# Patient Record
Sex: FEMALE | Race: WHITE | NOT HISPANIC OR LATINO | URBAN - METROPOLITAN AREA
[De-identification: names, ages, dates, MRNs, and addresses within clinical notes are randomized per-mention and may not be internally consistent; named-entity substitution may affect disease eponyms.]

---

## 2018-02-05 ENCOUNTER — EMERGENCY (EMERGENCY)
Facility: HOSPITAL | Age: 58
LOS: 1 days | End: 2018-02-05
Payer: MEDICAID

## 2018-02-05 PROCEDURE — 76856 US EXAM PELVIC COMPLETE: CPT | Mod: 26

## 2018-02-05 PROCEDURE — 99284 EMERGENCY DEPT VISIT MOD MDM: CPT

## 2018-02-05 PROCEDURE — 74177 CT ABD & PELVIS W/CONTRAST: CPT | Mod: 26

## 2018-10-01 ENCOUNTER — EMERGENCY (EMERGENCY)
Facility: HOSPITAL | Age: 58
LOS: 1 days | End: 2018-10-01
Payer: MEDICAID

## 2018-10-01 PROCEDURE — 73660 X-RAY EXAM OF TOE(S): CPT | Mod: 26,LT

## 2018-10-01 PROCEDURE — 99283 EMERGENCY DEPT VISIT LOW MDM: CPT

## 2019-04-17 ENCOUNTER — INBOUND DOCUMENT (OUTPATIENT)
Age: 59
End: 2019-04-17

## 2019-04-17 PROBLEM — Z00.00 ENCOUNTER FOR PREVENTIVE HEALTH EXAMINATION: Status: ACTIVE | Noted: 2019-04-17

## 2019-10-24 ENCOUNTER — APPOINTMENT (OUTPATIENT)
Dept: CARDIOLOGY | Facility: CLINIC | Age: 59
End: 2019-10-24
Payer: MEDICAID

## 2019-10-24 ENCOUNTER — NON-APPOINTMENT (OUTPATIENT)
Age: 59
End: 2019-10-24

## 2019-10-24 VITALS
HEART RATE: 80 BPM | HEIGHT: 61 IN | OXYGEN SATURATION: 98 % | SYSTOLIC BLOOD PRESSURE: 112 MMHG | DIASTOLIC BLOOD PRESSURE: 68 MMHG | BODY MASS INDEX: 23.6 KG/M2 | WEIGHT: 125 LBS

## 2019-10-24 DIAGNOSIS — Z78.9 OTHER SPECIFIED HEALTH STATUS: ICD-10-CM

## 2019-10-24 DIAGNOSIS — F17.200 NICOTINE DEPENDENCE, UNSPECIFIED, UNCOMPLICATED: ICD-10-CM

## 2019-10-24 DIAGNOSIS — Z86.59 PERSONAL HISTORY OF OTHER MENTAL AND BEHAVIORAL DISORDERS: ICD-10-CM

## 2019-10-24 DIAGNOSIS — Z82.5 FAMILY HISTORY OF ASTHMA AND OTHER CHRONIC LOWER RESPIRATORY DISEASES: ICD-10-CM

## 2019-10-24 DIAGNOSIS — Z82.49 FAMILY HISTORY OF ISCHEMIC HEART DISEASE AND OTHER DISEASES OF THE CIRCULATORY SYSTEM: ICD-10-CM

## 2019-10-24 DIAGNOSIS — Z72.89 OTHER PROBLEMS RELATED TO LIFESTYLE: ICD-10-CM

## 2019-10-24 DIAGNOSIS — Z80.0 FAMILY HISTORY OF MALIGNANT NEOPLASM OF DIGESTIVE ORGANS: ICD-10-CM

## 2019-10-24 PROCEDURE — 99204 OFFICE O/P NEW MOD 45 MIN: CPT | Mod: 25

## 2019-10-24 PROCEDURE — 93000 ELECTROCARDIOGRAM COMPLETE: CPT

## 2019-10-24 NOTE — PHYSICAL EXAM
[Normal Appearance] : normal appearance [General Appearance - Well Developed] : well developed [General Appearance - In No Acute Distress] : no acute distress [General Appearance - Well Nourished] : well nourished [Well Groomed] : well groomed [Normal Conjunctiva] : the conjunctiva exhibited no abnormalities [Normal Oral Mucosa] : normal oral mucosa [Eyelids - No Xanthelasma] : the eyelids demonstrated no xanthelasmas [No Oral Pallor] : no oral pallor [No Oral Cyanosis] : no oral cyanosis [FreeTextEntry1] : No JVD, no carotid artery bruits auscultated bilaterally [Heart Rate And Rhythm] : heart rate and rhythm were normal [Edema] : no peripheral edema present [Murmurs] : no murmurs present [Heart Sounds] : normal S1 and S2 [Respiration, Rhythm And Depth] : normal respiratory rhythm and effort [Exaggerated Use Of Accessory Muscles For Inspiration] : no accessory muscle use [Abnormal Walk] : normal gait [Auscultation Breath Sounds / Voice Sounds] : lungs were clear to auscultation bilaterally [Nail Clubbing] : no clubbing of the fingernails [Gait - Sufficient For Exercise Testing] : the gait was sufficient for exercise testing [Cyanosis, Localized] : no localized cyanosis [Skin Color & Pigmentation] : normal skin color and pigmentation [] : no rash [Impaired Insight] : insight and judgment were intact [Skin Turgor] : normal skin turgor [Affect] : the affect was normal [Memory Recent] : recent memory was not impaired

## 2019-10-24 NOTE — DISCUSSION/SUMMARY
[FreeTextEntry1] : 1. Chest Pain/Dyspnea: I am recommending a plain treadmill stress test and echocardiogram. I educated the patient on the importance of smoking cessation going forward no matter what our testing reveals. I educated her on the dangers of continued smoking. \par \par I will call patient with results.

## 2019-10-24 NOTE — REVIEW OF SYSTEMS
[Shortness Of Breath] : shortness of breath [Dyspnea on exertion] : dyspnea during exertion [Chest Pain] : chest pain [Lower Ext Edema] : no extremity edema [Palpitations] : no palpitations [Cough] : no cough [Wheezing] : no wheezing [Joint Pain] : joint pain [Joint Stiffness] : joint stiffness [Depression] : depression [Anxiety] : anxiety [Negative] : Heme/Lymph

## 2019-11-01 ENCOUNTER — APPOINTMENT (OUTPATIENT)
Dept: CARDIOLOGY | Facility: CLINIC | Age: 59
End: 2019-11-01
Payer: MEDICAID

## 2019-11-01 PROCEDURE — 93306 TTE W/DOPPLER COMPLETE: CPT

## 2019-11-07 ENCOUNTER — APPOINTMENT (OUTPATIENT)
Dept: MRI IMAGING | Facility: CLINIC | Age: 59
End: 2019-11-07
Payer: MEDICAID

## 2019-11-07 PROCEDURE — 72148 MRI LUMBAR SPINE W/O DYE: CPT

## 2019-11-21 ENCOUNTER — APPOINTMENT (OUTPATIENT)
Dept: CARDIOLOGY | Facility: CLINIC | Age: 59
End: 2019-11-21

## 2019-12-13 ENCOUNTER — APPOINTMENT (OUTPATIENT)
Dept: CARDIOLOGY | Facility: CLINIC | Age: 59
End: 2019-12-13
Payer: MEDICAID

## 2019-12-13 VITALS
DIASTOLIC BLOOD PRESSURE: 72 MMHG | SYSTOLIC BLOOD PRESSURE: 110 MMHG | HEART RATE: 61 BPM | BODY MASS INDEX: 22.84 KG/M2 | WEIGHT: 121 LBS | HEIGHT: 61 IN | OXYGEN SATURATION: 98 %

## 2019-12-13 PROCEDURE — 99214 OFFICE O/P EST MOD 30 MIN: CPT

## 2019-12-13 NOTE — DISCUSSION/SUMMARY
[FreeTextEntry1] : 1. Chest Pain/Dyspnea: normal echocardiogram. Patient did not have stress test done. No more chest pain. Discussed risks of undiagnosed obstructive CAD which can be picked up by stress testing. Patient states she wants to think about it. Also discussed the risks of continued smoking and she doesn't appear ready to think about quitting at this time. \par \par Patient can follow up on an as needed basis.

## 2019-12-13 NOTE — PHYSICAL EXAM
[General Appearance - Well Developed] : well developed [Normal Appearance] : normal appearance [Well Groomed] : well groomed [General Appearance - Well Nourished] : well nourished [General Appearance - In No Acute Distress] : no acute distress [Normal Conjunctiva] : the conjunctiva exhibited no abnormalities [Eyelids - No Xanthelasma] : the eyelids demonstrated no xanthelasmas [No Oral Pallor] : no oral pallor [Normal Oral Mucosa] : normal oral mucosa [No Oral Cyanosis] : no oral cyanosis [FreeTextEntry1] : No JVD, no carotid artery bruits auscultated bilaterally [Respiration, Rhythm And Depth] : normal respiratory rhythm and effort [Exaggerated Use Of Accessory Muscles For Inspiration] : no accessory muscle use [Auscultation Breath Sounds / Voice Sounds] : lungs were clear to auscultation bilaterally [Heart Sounds] : normal S1 and S2 [Murmurs] : no murmurs present [Heart Rate And Rhythm] : heart rate and rhythm were normal [Abnormal Walk] : normal gait [Edema] : no peripheral edema present [Nail Clubbing] : no clubbing of the fingernails [Cyanosis, Localized] : no localized cyanosis [Gait - Sufficient For Exercise Testing] : the gait was sufficient for exercise testing [Skin Color & Pigmentation] : normal skin color and pigmentation [Skin Turgor] : normal skin turgor [] : no rash [Impaired Insight] : insight and judgment were intact [Affect] : the affect was normal [Memory Recent] : recent memory was not impaired

## 2019-12-13 NOTE — HISTORY OF PRESENT ILLNESS
[FreeTextEntry1] : 59 year old female with history of anxiety/depression, active smoker (1/2ppd since age of 15) presents in cardiac consultation for chest pain. Patient states that about two weeks ago she developed chest pain mild-moderate in severity in the center of her chest. It lasted about 90 minutes. Deep breaths made it worse. Nothing made it better. Didn't radiate anywhere. Resolved on its own. 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 Has been going on for many months. Mild in severity. Not getting any better or worse. No associated exertional chest pain.

## 2019-12-13 NOTE — REVIEW OF SYSTEMS
[Shortness Of Breath] : shortness of breath [Dyspnea on exertion] : dyspnea during exertion [Chest Pain] : chest pain [Lower Ext Edema] : no extremity edema [Palpitations] : no palpitations [Cough] : no cough [Joint Pain] : joint pain [Wheezing] : no wheezing [Joint Stiffness] : joint stiffness [Depression] : depression [Anxiety] : anxiety [Negative] : Psychiatric

## 2020-02-27 ENCOUNTER — OUTPATIENT (OUTPATIENT)
Dept: OUTPATIENT SERVICES | Facility: HOSPITAL | Age: 60
LOS: 1 days | End: 2020-02-27
Payer: MEDICAID

## 2020-02-27 PROCEDURE — 71260 CT THORAX DX C+: CPT | Mod: 26

## 2020-06-19 ENCOUNTER — OUTPATIENT (OUTPATIENT)
Dept: OUTPATIENT SERVICES | Facility: HOSPITAL | Age: 60
LOS: 1 days | End: 2020-06-19

## 2020-09-04 ENCOUNTER — APPOINTMENT (OUTPATIENT)
Dept: CT IMAGING | Facility: CLINIC | Age: 60
End: 2020-09-04
Payer: MEDICAID

## 2020-09-04 PROCEDURE — 74176 CT ABD & PELVIS W/O CONTRAST: CPT

## 2020-12-10 ENCOUNTER — APPOINTMENT (OUTPATIENT)
Dept: MRI IMAGING | Facility: CLINIC | Age: 60
End: 2020-12-10
Payer: MEDICAID

## 2020-12-10 PROCEDURE — 70551 MRI BRAIN STEM W/O DYE: CPT

## 2021-02-25 ENCOUNTER — APPOINTMENT (OUTPATIENT)
Dept: CARDIOLOGY | Facility: CLINIC | Age: 61
End: 2021-02-25
Payer: MEDICAID

## 2021-02-25 ENCOUNTER — NON-APPOINTMENT (OUTPATIENT)
Age: 61
End: 2021-02-25

## 2021-02-25 VITALS
DIASTOLIC BLOOD PRESSURE: 72 MMHG | SYSTOLIC BLOOD PRESSURE: 130 MMHG | BODY MASS INDEX: 22.47 KG/M2 | OXYGEN SATURATION: 98 % | TEMPERATURE: 97.7 F | HEART RATE: 80 BPM | WEIGHT: 119 LBS | HEIGHT: 61 IN

## 2021-02-25 PROCEDURE — 99072 ADDL SUPL MATRL&STAF TM PHE: CPT

## 2021-02-25 PROCEDURE — 93242 EXT ECG>48HR<7D RECORDING: CPT

## 2021-02-25 PROCEDURE — 93000 ELECTROCARDIOGRAM COMPLETE: CPT | Mod: 59

## 2021-02-25 PROCEDURE — 99214 OFFICE O/P EST MOD 30 MIN: CPT | Mod: 25

## 2021-02-25 PROCEDURE — 93015 CV STRESS TEST SUPVJ I&R: CPT

## 2021-02-25 RX ORDER — ALPRAZOLAM 0.25 MG/1
0.25 TABLET ORAL
Refills: 0 | Status: DISCONTINUED | COMMUNITY
End: 2021-02-25

## 2021-02-25 RX ORDER — MULTIVITAMIN
TABLET ORAL
Refills: 0 | Status: DISCONTINUED | COMMUNITY
End: 2021-02-25

## 2021-02-25 RX ORDER — ASCORBIC ACID 500 MG
TABLET ORAL
Refills: 0 | Status: DISCONTINUED | COMMUNITY
End: 2021-02-25

## 2021-02-25 RX ORDER — VITAMIN B COMPLEX
CAPSULE ORAL
Refills: 0 | Status: DISCONTINUED | COMMUNITY
End: 2021-02-25

## 2021-02-25 RX ORDER — VITAMIN E ACID SUCCINATE 268 MG
TABLET ORAL
Refills: 0 | Status: DISCONTINUED | COMMUNITY
End: 2021-02-25

## 2021-02-25 NOTE — REVIEW OF SYSTEMS
[Chest Pain] : chest pain [Palpitations] : palpitations [Dizziness] : dizziness [Negative] : Heme/Lymph

## 2021-02-26 NOTE — ADDENDUM
[FreeTextEntry1] : Please note the patient was reviewed with the NP.\par I was physically present during the service of the patient\pedro luis I was directly involved in the management plan and recommendations of care provided to the patient. \par I personally reviewed the history and physical exam and plan as documented by the NP above.\par \par Gilberto Ha DO, FACC, RPVI\par Cardiologist\par 02/26/2021

## 2021-02-26 NOTE — HISTORY OF PRESENT ILLNESS
[FreeTextEntry1] : This is an 60-year-old female that presents to the office with reoccurring discomfort of chest pain.  PHMx anxiety, Depression, reoccurring Chest Pain/pressure \par \par Patient states she will have episodes of left sided chest discomfort under her breasts, described as sharp, lasting 30 minutes at times, no aggravating or alleviating factors, nonreproducible.  Patient also states she suffers from anxiety and has episodes of palpitations daily sometimes relieved by Xanax.  Patient also endorses episodes of bilateral arm pain which she believes could be from poor sleep habits.  Patient had COVID in March 2020 and states she has been having dizzy episodes since then she is followed by neurology and performs vestibular therapy.  \par \par Recent echo on 11/19 minimal MR\par EKG reviewed by myself today normal sinus rhythm \par \par Patient has been offered exercise stress test in the past and she declined.  \par Patient was anxious but agreeable to perform ETT in office today.  \par Exercise stress test was performed: nonischemic, asymptomatic, no arrhythmias, and blood pressure well controlled.  \par \par Labs review TSH WNL

## 2021-02-26 NOTE — ASSESSMENT
[FreeTextEntry1] : This is a 60 year F with the above PMH and below problems were addressed during today's cardiovascular care visit. Patient verbalizes they understand the plan and any questions and concerns were addressed.\par \par Atypical chest pain: Patient had nonischemic exercise stress test performed today.\par \par Palpitations: Have requested 3 days ZIO to review for arrhythmias\par \par We will call patient with testing results and schedule 6-month follow-up.  Reviewed red flag symptoms which would warrant emergent medical evaluation.\par \par Follow up with our office in 6 months unless otherwise indicated,\par Discussed red flag symptoms, which would warrant sooner or emergent medical evaluation.\par \par \par Sincerely,\par \par MARIELENA Vargas \par Patients history, testing, and plan reviewed with supervising MD: Dr. Gilberto Ha\par \par \par \par

## 2021-03-01 NOTE — PHYSICAL EXAM
[General Appearance - Well Developed] : well developed [Normal Appearance] : normal appearance [Well Groomed] : well groomed [General Appearance - Well Nourished] : well nourished [No Deformities] : no deformities [General Appearance - In No Acute Distress] : no acute distress [Normal Conjunctiva] : the conjunctiva exhibited no abnormalities [Eyelids - No Xanthelasma] : the eyelids demonstrated no xanthelasmas [Normal Oral Mucosa] : normal oral mucosa [Respiration, Rhythm And Depth] : normal respiratory rhythm and effort [Exaggerated Use Of Accessory Muscles For Inspiration] : no accessory muscle use [Auscultation Breath Sounds / Voice Sounds] : lungs were clear to auscultation bilaterally [Heart Rate And Rhythm] : heart rate and rhythm were normal [Heart Sounds] : normal S1 and S2 [Murmurs] : no murmurs present [Gait - Sufficient For Exercise Testing] : the gait was sufficient for exercise testing [Skin Color & Pigmentation] : normal skin color and pigmentation [] : no rash [No Venous Stasis] : no venous stasis [Skin Lesions] : no skin lesions [No Skin Ulcers] : no skin ulcer [No Xanthoma] : no  xanthoma was observed [Oriented To Time, Place, And Person] : oriented to person, place, and time [Affect] : the affect was normal [Mood] : the mood was normal [FreeTextEntry1] : No JVD, no carotid artery bruits auscultated bilaterally

## 2021-03-09 PROCEDURE — 93244 EXT ECG>48HR<7D REV&INTERPJ: CPT

## 2021-03-09 PROCEDURE — 99072 ADDL SUPL MATRL&STAF TM PHE: CPT

## 2021-03-29 ENCOUNTER — APPOINTMENT (OUTPATIENT)
Dept: UROLOGY | Facility: CLINIC | Age: 61
End: 2021-03-29

## 2021-04-02 ENCOUNTER — APPOINTMENT (OUTPATIENT)
Dept: UROLOGY | Facility: CLINIC | Age: 61
End: 2021-04-02

## 2021-04-29 ENCOUNTER — APPOINTMENT (OUTPATIENT)
Dept: MAMMOGRAPHY | Facility: CLINIC | Age: 61
End: 2021-04-29

## 2021-06-20 ENCOUNTER — EMERGENCY (EMERGENCY)
Facility: HOSPITAL | Age: 61
LOS: 1 days | End: 2021-06-20
Admitting: EMERGENCY MEDICINE
Payer: MEDICAID

## 2021-06-20 PROCEDURE — 71275 CT ANGIOGRAPHY CHEST: CPT | Mod: 26

## 2021-06-20 PROCEDURE — 99285 EMERGENCY DEPT VISIT HI MDM: CPT

## 2021-06-20 PROCEDURE — 93010 ELECTROCARDIOGRAM REPORT: CPT

## 2021-08-18 ENCOUNTER — OUTPATIENT (OUTPATIENT)
Dept: OUTPATIENT SERVICES | Facility: HOSPITAL | Age: 61
LOS: 1 days | End: 2021-08-18
Payer: MEDICAID

## 2021-08-18 PROCEDURE — 73522 X-RAY EXAM HIPS BI 3-4 VIEWS: CPT | Mod: 26

## 2021-08-25 ENCOUNTER — OUTPATIENT (OUTPATIENT)
Dept: OUTPATIENT SERVICES | Facility: HOSPITAL | Age: 61
LOS: 1 days | End: 2021-08-25
Payer: MEDICAID

## 2021-08-25 PROCEDURE — 73562 X-RAY EXAM OF KNEE 3: CPT | Mod: 26,RT

## 2021-12-09 ENCOUNTER — APPOINTMENT (OUTPATIENT)
Dept: RADIOLOGY | Facility: CLINIC | Age: 61
End: 2021-12-09
Payer: MEDICAID

## 2021-12-09 PROCEDURE — 71046 X-RAY EXAM CHEST 2 VIEWS: CPT

## 2021-12-14 ENCOUNTER — APPOINTMENT (OUTPATIENT)
Dept: CARDIOLOGY | Facility: CLINIC | Age: 61
End: 2021-12-14
Payer: MEDICAID

## 2021-12-14 ENCOUNTER — NON-APPOINTMENT (OUTPATIENT)
Age: 61
End: 2021-12-14

## 2021-12-14 VITALS
DIASTOLIC BLOOD PRESSURE: 76 MMHG | SYSTOLIC BLOOD PRESSURE: 116 MMHG | BODY MASS INDEX: 21.71 KG/M2 | HEIGHT: 61 IN | OXYGEN SATURATION: 99 % | HEART RATE: 86 BPM | WEIGHT: 115 LBS

## 2021-12-14 PROCEDURE — 93000 ELECTROCARDIOGRAM COMPLETE: CPT

## 2021-12-14 PROCEDURE — 99072 ADDL SUPL MATRL&STAF TM PHE: CPT

## 2021-12-14 PROCEDURE — 99214 OFFICE O/P EST MOD 30 MIN: CPT | Mod: 25

## 2021-12-14 RX ORDER — LORATADINE 10 MG/1
10 CAPSULE, LIQUID FILLED ORAL DAILY
Refills: 0 | Status: DISCONTINUED | COMMUNITY
End: 2021-12-14

## 2021-12-14 RX ORDER — ALPRAZOLAM 0.5 MG/1
0.5 TABLET ORAL DAILY
Qty: 10 | Refills: 0 | Status: ACTIVE | COMMUNITY

## 2021-12-14 NOTE — CARDIOLOGY SUMMARY
[de-identified] : 12/14/2021, NSR, normal ECG [de-identified] : 2/25/2021 3 Day Zio, NSR with rare ectopy. [de-identified] : 2/25/2021, Plain Treadmill Stress Test: Exercised for 6 minutes and 25 seconds utilizing Standard Steve Protocol. No chest pain or abnormal dyspnea. No ECG changes to suggest ischemia.\par  [de-identified] : 11/1/2019, Trace MR LVEF >65%.

## 2021-12-14 NOTE — DISCUSSION/SUMMARY
[FreeTextEntry1] : 1. Dyspnea: recommend echocardiogram. Patient still smoking 3 cigarettes/day. Advised cessation. Patient pending PFTs with pulmonologist, Dr. Coronado.\par \par 2. HLD: continue atorvastatin 20mg daily.\par \par 3. Palpitations: occasional. Already wore 3 day Zio, February 2021, which demonstrated rare ectopy. No need for further workup. \par \par 4. Leg pain: patient states she has neuropathy. However, given the smoking history, recommend ABIs.\par \par Follow up 2-3 months.

## 2021-12-14 NOTE — HISTORY OF PRESENT ILLNESS
[FreeTextEntry1] : Historical Perspective:\par 61 year old female with history of anxiety/depression, active smoker (1/2ppd since age of 15) presents in cardiac consultation for chest pain. Patient states that about two weeks ago she developed chest pain mild-moderate in severity in the center of her chest. It lasted about 90 minutes. Deep breaths made it worse. Nothing made it better. Didn't radiate anywhere. Resolved on its own. 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 Has been going on for many months. Mild in severity. Not getting any better or worse. No associated exertional chest pain. \par \par Current Health Status:\par Patient states that over the past few months she has been getting dyspnea at rest. No chest pain. Occasional palpitations at night. Patient states that when she exerts herself (she does physical therapy) she gets no symptoms. Also has cold and burning in her feet. No exertional symptoms with regards to her legs.\par

## 2021-12-14 NOTE — PHYSICAL EXAM
[Normal] : moves all extremities, no focal deficits, normal speech [de-identified] : No carotid bruits auscultated bilaterally

## 2022-01-03 ENCOUNTER — APPOINTMENT (OUTPATIENT)
Dept: CARDIOLOGY | Facility: CLINIC | Age: 62
End: 2022-01-03
Payer: MEDICAID

## 2022-01-03 PROCEDURE — 93306 TTE W/DOPPLER COMPLETE: CPT

## 2022-01-03 PROCEDURE — 99072 ADDL SUPL MATRL&STAF TM PHE: CPT

## 2022-01-05 ENCOUNTER — NON-APPOINTMENT (OUTPATIENT)
Age: 62
End: 2022-01-05

## 2022-01-05 ENCOUNTER — APPOINTMENT (OUTPATIENT)
Dept: CARDIOLOGY | Facility: CLINIC | Age: 62
End: 2022-01-05
Payer: MEDICAID

## 2022-01-05 ENCOUNTER — APPOINTMENT (OUTPATIENT)
Dept: CARDIOLOGY | Facility: CLINIC | Age: 62
End: 2022-01-05

## 2022-01-05 VITALS
DIASTOLIC BLOOD PRESSURE: 84 MMHG | BODY MASS INDEX: 21.9 KG/M2 | RESPIRATION RATE: 18 BRPM | HEIGHT: 61 IN | TEMPERATURE: 98 F | WEIGHT: 116 LBS | HEART RATE: 70 BPM | SYSTOLIC BLOOD PRESSURE: 118 MMHG | OXYGEN SATURATION: 99 %

## 2022-01-05 PROCEDURE — 93923 UPR/LXTR ART STDY 3+ LVLS: CPT

## 2022-01-05 PROCEDURE — 99072 ADDL SUPL MATRL&STAF TM PHE: CPT

## 2022-01-06 ENCOUNTER — APPOINTMENT (OUTPATIENT)
Dept: CARDIOLOGY | Facility: CLINIC | Age: 62
End: 2022-01-06
Payer: MEDICAID

## 2022-01-06 VITALS
SYSTOLIC BLOOD PRESSURE: 110 MMHG | WEIGHT: 116 LBS | HEART RATE: 78 BPM | OXYGEN SATURATION: 99 % | HEIGHT: 55 IN | BODY MASS INDEX: 26.85 KG/M2 | DIASTOLIC BLOOD PRESSURE: 66 MMHG

## 2022-01-06 DIAGNOSIS — M79.606 PAIN IN LEG, UNSPECIFIED: ICD-10-CM

## 2022-01-06 PROCEDURE — 99214 OFFICE O/P EST MOD 30 MIN: CPT

## 2022-01-06 PROCEDURE — 99072 ADDL SUPL MATRL&STAF TM PHE: CPT

## 2022-01-06 NOTE — PHYSICAL EXAM
[Normal] : moves all extremities, no focal deficits, normal speech [de-identified] : No carotid bruits auscultated bilaterally

## 2022-01-06 NOTE — CARDIOLOGY SUMMARY
[de-identified] : 12/14/2021, NSR, normal ECG [de-identified] : 2/25/2021 3 Day Zio, NSR with rare ectopy. [de-identified] : 2/25/2021, Plain Treadmill Stress Test: Exercised for 6 minutes and 25 seconds utilizing Standard Steve Protocol. No chest pain or abnormal dyspnea. No ECG changes to suggest ischemia.\par  [de-identified] : 1/3/2022, LV EF 55-60%, trace MR, normal LV diastolic function, mild TR with estimated PASP 28mmHg.\par 11/1/2019, Trace MR LVEF >65%. [de-identified] : 1/5/2022, KYARA, essential normal

## 2022-01-06 NOTE — DISCUSSION/SUMMARY
[FreeTextEntry1] : 1. Dyspnea: echo showed normal LV systolic function and no significant valvular disease. Recommend pharmacologic nuclear stres testing.  Patient still smoking 3 cigarettes/day. Advised cessation. Patient had PFTs with pulmonologist, Dr. Coronado.\par \par 2. HLD: continue atorvastatin 20mg daily.\par \par 3. Palpitations: occasional. Already wore 3 day Zio, February 2021, which demonstrated rare ectopy. No need for further workup. \par \par 4. Leg pain: patient states she has neuropathy. However, given the smoking history, recommended ABIs. They were essentially normal.\par \par Follow up in 6 months.

## 2022-01-27 ENCOUNTER — APPOINTMENT (OUTPATIENT)
Dept: CARDIOLOGY | Facility: CLINIC | Age: 62
End: 2022-01-27

## 2022-02-11 ENCOUNTER — APPOINTMENT (OUTPATIENT)
Dept: CARDIOLOGY | Facility: CLINIC | Age: 62
End: 2022-02-11
Payer: MEDICAID

## 2022-02-11 PROCEDURE — 93015 CV STRESS TEST SUPVJ I&R: CPT

## 2022-02-11 PROCEDURE — 78452 HT MUSCLE IMAGE SPECT MULT: CPT

## 2022-02-11 PROCEDURE — 99072 ADDL SUPL MATRL&STAF TM PHE: CPT

## 2022-02-11 PROCEDURE — A9502: CPT

## 2022-02-14 ENCOUNTER — NON-APPOINTMENT (OUTPATIENT)
Age: 62
End: 2022-02-14

## 2022-02-15 ENCOUNTER — APPOINTMENT (OUTPATIENT)
Dept: CARDIOLOGY | Facility: CLINIC | Age: 62
End: 2022-02-15
Payer: MEDICAID

## 2022-02-15 VITALS
HEIGHT: 60 IN | OXYGEN SATURATION: 100 % | WEIGHT: 116 LBS | SYSTOLIC BLOOD PRESSURE: 100 MMHG | TEMPERATURE: 97 F | DIASTOLIC BLOOD PRESSURE: 62 MMHG | BODY MASS INDEX: 22.78 KG/M2 | HEART RATE: 95 BPM

## 2022-02-15 PROCEDURE — 99214 OFFICE O/P EST MOD 30 MIN: CPT

## 2022-02-15 PROCEDURE — 93242 EXT ECG>48HR<7D RECORDING: CPT

## 2022-02-15 PROCEDURE — 99072 ADDL SUPL MATRL&STAF TM PHE: CPT

## 2022-02-15 NOTE — HISTORY OF PRESENT ILLNESS
[FreeTextEntry1] : Palpitations: The patient continues to have on-and-off palpitations.  The same time she has palpitations she feels out of breath and occasionally chest discomfort.  They occur approximately 2 times per week.  They last seconds to minutes at a time.  7-day event monitoring has been arranged.\par \par Shortness of breath: Noninvasive testing is negative.  We will reevaluate after event monitor.

## 2022-02-16 ENCOUNTER — NON-APPOINTMENT (OUTPATIENT)
Age: 62
End: 2022-02-16

## 2022-02-17 RX ORDER — CLOTRIMAZOLE 10 MG/G
1 CREAM VAGINAL
Qty: 45 | Refills: 0 | Status: DISCONTINUED | COMMUNITY
Start: 2021-08-23

## 2022-02-17 RX ORDER — IBANDRONATE SODIUM 150 MG/1
150 TABLET ORAL
Qty: 1 | Refills: 0 | Status: DISCONTINUED | COMMUNITY
Start: 2021-10-13

## 2022-02-17 RX ORDER — ACYCLOVIR 50 MG/G
5 OINTMENT TOPICAL
Qty: 15 | Refills: 0 | Status: DISCONTINUED | COMMUNITY
Start: 2021-11-04

## 2022-02-17 RX ORDER — LORATADINE 10 MG/1
10 TABLET ORAL
Qty: 30 | Refills: 0 | Status: DISCONTINUED | COMMUNITY
Start: 2021-02-23

## 2022-02-17 RX ORDER — AZITHROMYCIN 250 MG/1
250 TABLET, FILM COATED ORAL
Qty: 6 | Refills: 0 | Status: DISCONTINUED | COMMUNITY
Start: 2021-12-05

## 2022-02-17 RX ORDER — MELOXICAM 15 MG/1
15 TABLET ORAL
Qty: 30 | Refills: 0 | Status: DISCONTINUED | COMMUNITY
Start: 2021-08-18

## 2022-02-17 RX ORDER — SERTRALINE 25 MG/1
25 TABLET, FILM COATED ORAL
Qty: 30 | Refills: 0 | Status: DISCONTINUED | COMMUNITY
Start: 2021-05-25

## 2022-03-01 ENCOUNTER — NON-APPOINTMENT (OUTPATIENT)
Age: 62
End: 2022-03-01

## 2022-03-04 PROCEDURE — 99072 ADDL SUPL MATRL&STAF TM PHE: CPT

## 2022-03-04 PROCEDURE — 93244 EXT ECG>48HR<7D REV&INTERPJ: CPT

## 2022-03-10 ENCOUNTER — NON-APPOINTMENT (OUTPATIENT)
Age: 62
End: 2022-03-10

## 2022-03-11 ENCOUNTER — NON-APPOINTMENT (OUTPATIENT)
Age: 62
End: 2022-03-11

## 2022-03-15 ENCOUNTER — APPOINTMENT (OUTPATIENT)
Dept: CARDIOLOGY | Facility: CLINIC | Age: 62
End: 2022-03-15
Payer: MEDICAID

## 2022-03-15 VITALS
SYSTOLIC BLOOD PRESSURE: 122 MMHG | DIASTOLIC BLOOD PRESSURE: 60 MMHG | TEMPERATURE: 97.5 F | BODY MASS INDEX: 22.58 KG/M2 | WEIGHT: 115 LBS | HEART RATE: 91 BPM | HEIGHT: 60 IN | OXYGEN SATURATION: 99 %

## 2022-03-15 PROCEDURE — 99214 OFFICE O/P EST MOD 30 MIN: CPT

## 2022-03-15 PROCEDURE — 99072 ADDL SUPL MATRL&STAF TM PHE: CPT

## 2022-03-15 NOTE — HISTORY OF PRESENT ILLNESS
[FreeTextEntry1] : Chest discomfort: Patient continues to have on-and-off chest discomfort.  These are completely unrelated to exertion.  They are extremely atypical for coronary artery disease.  Noninvasive testing is essentially negative.  Overall elected to continue observation at this time.\par \par Shortness of breath: This seems coincident with Covid infection.  The heart is structurally normal.\par \par We have reviewed the risks and benefits of CT coronary angiography but overall have elected to continue observation at this time.  We will reevaluate for CT coronary angiography in the future.\par \par Hyperlipidemia: The patient had mild transaminitis.  Statin therapy was discontinued.  Transaminases are normal.  Overall have recommended repeat LFTs and cholesterol profile in 3 months.

## 2022-03-15 NOTE — ASSESSMENT
[FreeTextEntry1] : Chest discomfort: Patient continues to have on-and-off chest discomfort.  These are completely unrelated to exertion.  They are extremely atypical for coronary artery disease.  Noninvasive testing is essentially negative.  Overall elected to continue observation at this time.\par \par Shortness of breath: This seems coincident with Covid infection.  The heart is structurally normal.\par \par We have reviewed the risks and benefits of CT coronary angiography but overall have elected to continue observation at this time.  We will reevaluate for CT coronary angiography in the future.\par \par Hyperlipidemia: The patient had mild transaminitis.  Statin therapy was discontinued.  Transaminases are normal.  Overall have recommended repeat LFTs and cholesterol profile in 3 months.
2 seconds or less

## 2022-05-05 RX ORDER — ATORVASTATIN CALCIUM 10 MG/1
10 TABLET, FILM COATED ORAL
Qty: 30 | Refills: 0 | Status: DISCONTINUED | COMMUNITY
Start: 2022-01-19 | End: 2022-02-16

## 2022-05-24 RX ORDER — ATORVASTATIN CALCIUM 10 MG/1
10 TABLET, FILM COATED ORAL
Qty: 90 | Refills: 2 | Status: DISCONTINUED | COMMUNITY
End: 2022-05-24

## 2022-06-08 ENCOUNTER — APPOINTMENT (OUTPATIENT)
Dept: CARDIOLOGY | Facility: CLINIC | Age: 62
End: 2022-06-08
Payer: MEDICAID

## 2022-06-08 VITALS
WEIGHT: 112 LBS | HEART RATE: 84 BPM | OXYGEN SATURATION: 97 % | SYSTOLIC BLOOD PRESSURE: 122 MMHG | BODY MASS INDEX: 21.99 KG/M2 | DIASTOLIC BLOOD PRESSURE: 64 MMHG | HEIGHT: 60 IN | TEMPERATURE: 97.3 F

## 2022-06-08 PROCEDURE — 99212 OFFICE O/P EST SF 10 MIN: CPT

## 2022-06-08 NOTE — ASSESSMENT
[FreeTextEntry1] : Chest discomfort: No recurrence.  The patient exercises at rehab for 20 to 30 minutes or more without exertional symptoms.\par \par Shortness of breath: None recently.\par \par Hyperlipidemia: The patient had elevated transaminases on statin therapy.  She also had myalgias with normal CPK.  Her symptoms were caused from atorvastatin.  We will recheck cholesterol profile and decide on a different statin.

## 2022-07-05 ENCOUNTER — NON-APPOINTMENT (OUTPATIENT)
Age: 62
End: 2022-07-05

## 2022-07-06 ENCOUNTER — APPOINTMENT (OUTPATIENT)
Dept: ULTRASOUND IMAGING | Facility: CLINIC | Age: 62
End: 2022-07-06

## 2022-09-20 ENCOUNTER — APPOINTMENT (OUTPATIENT)
Dept: CARDIOLOGY | Facility: CLINIC | Age: 62
End: 2022-09-20

## 2022-09-20 VITALS
WEIGHT: 109 LBS | HEIGHT: 60 IN | BODY MASS INDEX: 21.4 KG/M2 | OXYGEN SATURATION: 95 % | HEART RATE: 87 BPM | TEMPERATURE: 97.3 F | DIASTOLIC BLOOD PRESSURE: 64 MMHG | SYSTOLIC BLOOD PRESSURE: 114 MMHG

## 2022-09-20 DIAGNOSIS — R07.9 CHEST PAIN, UNSPECIFIED: ICD-10-CM

## 2022-09-20 PROCEDURE — 99213 OFFICE O/P EST LOW 20 MIN: CPT

## 2022-09-20 NOTE — ASSESSMENT
[FreeTextEntry1] : Chest discomfort: Unfortunate the patient continues to have on-and-off chest discomfort.  He describes a brief like sensation lasting under 3 seconds.  Her exercise capacity is good.  Overall do not think this is cardiovascular in origin.\par \par Hyperlipidemia: Previously the patient had elevated transaminases on statin therapy.  She currently is taking rosuvastatin 5 mg daily.  LFTs are normal.  Overall we elected to increase her rosuvastatin to 10 mg daily.  LFTs and cholesterol profile have been arranged in 6 weeks time.  We will obtain CT calcium scoring of the heart.  This will influence decisions with regards to target goal of cholesterol treatment.

## 2022-11-02 ENCOUNTER — APPOINTMENT (OUTPATIENT)
Dept: CARDIOLOGY | Facility: CLINIC | Age: 62
End: 2022-11-02

## 2022-12-06 ENCOUNTER — NON-APPOINTMENT (OUTPATIENT)
Age: 62
End: 2022-12-06

## 2023-04-27 ENCOUNTER — APPOINTMENT (OUTPATIENT)
Dept: HEMATOLOGY ONCOLOGY | Facility: CLINIC | Age: 63
End: 2023-04-27

## 2023-04-27 ENCOUNTER — RESULT REVIEW (OUTPATIENT)
Age: 63
End: 2023-04-27

## 2023-04-27 ENCOUNTER — OUTPATIENT (OUTPATIENT)
Dept: OUTPATIENT SERVICES | Facility: HOSPITAL | Age: 63
LOS: 1 days | End: 2023-04-27
Payer: COMMERCIAL

## 2023-04-27 ENCOUNTER — APPOINTMENT (OUTPATIENT)
Dept: HEMATOLOGY ONCOLOGY | Facility: CLINIC | Age: 63
End: 2023-04-27
Payer: MEDICAID

## 2023-04-27 VITALS
BODY MASS INDEX: 22.07 KG/M2 | OXYGEN SATURATION: 97 % | DIASTOLIC BLOOD PRESSURE: 83 MMHG | WEIGHT: 113 LBS | HEART RATE: 76 BPM | RESPIRATION RATE: 18 BRPM | TEMPERATURE: 97.9 F | SYSTOLIC BLOOD PRESSURE: 131 MMHG

## 2023-04-27 DIAGNOSIS — Z86.39 PERSONAL HISTORY OF OTHER ENDOCRINE, NUTRITIONAL AND METABOLIC DISEASE: ICD-10-CM

## 2023-04-27 DIAGNOSIS — Z87.39 PERSONAL HISTORY OF OTHER DISEASES OF THE MUSCULOSKELETAL SYSTEM AND CONNECTIVE TISSUE: ICD-10-CM

## 2023-04-27 DIAGNOSIS — D64.9 ANEMIA, UNSPECIFIED: ICD-10-CM

## 2023-04-27 LAB
BASOPHILS # BLD AUTO: 0.03 K/UL — SIGNIFICANT CHANGE UP (ref 0–0.2)
BASOPHILS NFR BLD AUTO: 0.6 % — SIGNIFICANT CHANGE UP (ref 0–2)
EOSINOPHIL # BLD AUTO: 0.07 K/UL — SIGNIFICANT CHANGE UP (ref 0–0.5)
EOSINOPHIL NFR BLD AUTO: 1.4 % — SIGNIFICANT CHANGE UP (ref 0–6)
HCT VFR BLD CALC: 42.9 % — SIGNIFICANT CHANGE UP (ref 34.5–45)
HGB BLD-MCNC: 13.9 G/DL — SIGNIFICANT CHANGE UP (ref 11.5–15.5)
IMM GRANULOCYTES NFR BLD AUTO: 0.2 % — SIGNIFICANT CHANGE UP (ref 0–0.9)
LYMPHOCYTES # BLD AUTO: 1.78 K/UL — SIGNIFICANT CHANGE UP (ref 1–3.3)
LYMPHOCYTES # BLD AUTO: 34.6 % — SIGNIFICANT CHANGE UP (ref 13–44)
MCHC RBC-ENTMCNC: 30.5 PG — SIGNIFICANT CHANGE UP (ref 27–34)
MCHC RBC-ENTMCNC: 32.4 GM/DL — SIGNIFICANT CHANGE UP (ref 32–36)
MCV RBC AUTO: 94.1 FL — SIGNIFICANT CHANGE UP (ref 80–100)
MONOCYTES # BLD AUTO: 0.47 K/UL — SIGNIFICANT CHANGE UP (ref 0–0.9)
MONOCYTES NFR BLD AUTO: 9.1 % — SIGNIFICANT CHANGE UP (ref 2–14)
NEUTROPHILS # BLD AUTO: 2.79 K/UL — SIGNIFICANT CHANGE UP (ref 1.8–7.4)
NEUTROPHILS NFR BLD AUTO: 54.1 % — SIGNIFICANT CHANGE UP (ref 43–77)
NRBC # BLD: 0 /100 WBCS — SIGNIFICANT CHANGE UP (ref 0–0)
PLATELET # BLD AUTO: 154 K/UL — SIGNIFICANT CHANGE UP (ref 150–400)
RBC # BLD: 4.56 M/UL — SIGNIFICANT CHANGE UP (ref 3.8–5.2)
RBC # FLD: 12.9 % — SIGNIFICANT CHANGE UP (ref 10.3–14.5)
WBC # BLD: 5.15 K/UL — SIGNIFICANT CHANGE UP (ref 3.8–10.5)
WBC # FLD AUTO: 5.15 K/UL — SIGNIFICANT CHANGE UP (ref 3.8–10.5)

## 2023-04-27 PROCEDURE — 85027 COMPLETE CBC AUTOMATED: CPT

## 2023-04-27 PROCEDURE — 99204 OFFICE O/P NEW MOD 45 MIN: CPT

## 2023-04-28 LAB
ALBUMIN SERPL ELPH-MCNC: 4.7 G/DL
ALP BLD-CCNC: 83 U/L
ALT SERPL-CCNC: 37 U/L
ANION GAP SERPL CALC-SCNC: 13 MMOL/L
AST SERPL-CCNC: 33 U/L
BILIRUB SERPL-MCNC: 0.4 MG/DL
BUN SERPL-MCNC: 12 MG/DL
CALCIUM SERPL-MCNC: 10.4 MG/DL
CHLORIDE SERPL-SCNC: 102 MMOL/L
CO2 SERPL-SCNC: 24 MMOL/L
CREAT SERPL-MCNC: 0.8 MG/DL
CRP SERPL-MCNC: <3 MG/L
EBV EA AB SER IA-ACNC: 34.1 U/ML
EBV EA AB TITR SER IF: POSITIVE
EBV EA IGG SER QL IA: >600 U/ML
EBV EA IGG SER-ACNC: POSITIVE
EBV EA IGM SER IA-ACNC: NEGATIVE
EBV PATRN SPEC IB-IMP: NORMAL
EBV VCA IGG SER IA-ACNC: >750 U/ML
EBV VCA IGM SER QL IA: <10 U/ML
EGFR: 83 ML/MIN/1.73M2
EPSTEIN-BARR VIRUS CAPSID ANTIGEN IGG: POSITIVE
ERYTHROCYTE [SEDIMENTATION RATE] IN BLOOD BY WESTERGREN METHOD: 14 MM/HR
GLUCOSE SERPL-MCNC: 82 MG/DL
HBV CORE IGG+IGM SER QL: NONREACTIVE
HBV SURFACE AB SER QL: REACTIVE
HBV SURFACE AB SERPL IA-ACNC: >1000 MIU/ML
HBV SURFACE AG SER QL: NONREACTIVE
HCV AB SER QL: NONREACTIVE
HCV S/CO RATIO: 0.29 S/CO
HIV1+2 AB SPEC QL IA.RAPID: NONREACTIVE
LDH SERPL-CCNC: 197 U/L
POTASSIUM SERPL-SCNC: 4 MMOL/L
PROT SERPL-MCNC: 7 G/DL
RHEUMATOID FACT SER QL: <10 IU/ML
SODIUM SERPL-SCNC: 140 MMOL/L

## 2023-05-01 LAB
ANA PAT FLD IF-IMP: ABNORMAL
ANA SER IF-ACNC: ABNORMAL

## 2023-05-01 NOTE — PHYSICAL EXAM
[Fully active, able to carry on all pre-disease performance without restriction] : Status 0 - Fully active, able to carry on all pre-disease performance without restriction [Thin] : thin [Normal] : grossly intact [de-identified] : wears glasses

## 2023-05-01 NOTE — HISTORY OF PRESENT ILLNESS
[de-identified] : Referred by: Dr. Hernández\par PCP: Shelley Painting NP\par \par RENATE MCKENZIE  presented at age 62 year  on 2023  for evaluation of thrombocytopenia She reports that the platelets were first noted to be low at 145 in 2022. A repeat CBC on 22 was also mildly decreased at 147. Renate denies any increased bruising, bleeding, epistaxis, bleeding gums, dark stools or hematuria. \par The patient has a medical history of joint pain, RA, osteoporosis, neuropathy, long Covid symptoms, elevated cholesterol, elevated LFT's from cholesterol medication\par \par Laboratory studies from 23 reviewed and notable for: HGB= 13.8, HCT= 42, WBC= 4.6, Platelets= 147\par \par HCM: \par - COVID vaccination: 2022 Pfizer, had Covid in 2020\par - Colonoscopy:  Dr. Steen, (-) per pt\par - Gyn: Annual\par - Mammo: Annual -uDlce Pesriri\par - Lung cancer screen: 2020\par - DEXA: 2021- Osteoporosis\par \par SH: \par - Occupation: retired house keeper\par - Living situation: Home\par - Smoking/etoh/illicits: + smoker x 40 years, currently smokes 4 cigarettes a day; occasional ETOH, no illicits\par \par \par FH: \par Son alive age 35, obese, pre-diabetic\par Daughter alive age 28, Hep C, IVDA\par Father  age 65, gastric cancer\par Mother,  age 91 , complications of emphysema\par Paternal Uncle,  prostate cancer\par Maternal aunt , breast cancer\par \par  [de-identified] : Aubree reports that she feels well in general, but has ongoing joint pain as well as back pain. She has some SOB, but is a current smoker. Denies N/V/D, has occasional abdominal discomfort.  Currently denies recent fevers, enlarged LN's, illnesses or sick contacts. She does state she still experiences hot flashes although went through menopause in her upper 40's.

## 2023-05-01 NOTE — REASON FOR VISIT
[Initial Consultation] : an initial consultation for [Blood Count Assessment] : blood count assessment [FreeTextEntry2] : Thrombocytopenia

## 2023-05-01 NOTE — REVIEW OF SYSTEMS
[Vision Problems] : vision problems [Palpitations] : palpitations [Shortness Of Breath] : shortness of breath [SOB on Exertion] : shortness of breath during exertion [Abdominal Pain] : abdominal pain [Constipation] : constipation [Joint Pain] : joint pain [Joint Stiffness] : joint stiffness [Muscle Weakness] : muscle weakness [Dizziness] : dizziness [Anxiety] : anxiety [Fever] : no fever [Chills] : no chills [Recent Change In Weight] : ~T no recent weight change [Nosebleeds] : no nosebleeds [Lower Ext Edema] : no lower extremity edema [Wheezing] : no wheezing [Cough] : no cough [Vomiting] : no vomiting [Diarrhea] : no diarrhea [Vaginal Discharge] : no vaginal discharge [Skin Rash] : no skin rash [Skin Wound] : no skin wound [Fainting] : no fainting [Easy Bleeding] : no tendency for easy bleeding [Easy Bruising] : no tendency for easy bruising [Swollen Glands] : no swollen glands [FreeTextEntry3] : wears glasses [FreeTextEntry9] : Back pain [de-identified] : Takes Xanax

## 2023-05-01 NOTE — CONSULT LETTER
[Dear  ___] : Dear  [unfilled], [Consult Letter:] : I had the pleasure of evaluating your patient, [unfilled]. [( Thank you for referring [unfilled] for consultation for _____ )] : Thank you for referring [unfilled] for consultation for [unfilled] [Please see my note below.] : Please see my note below. [Consult Closing:] : Thank you very much for allowing me to participate in the care of this patient.  If you have any questions, please do not hesitate to contact me. [Sincerely,] : Sincerely, [___] : [unfilled] [FreeTextEntry3] : Sailaja Pop MD\par Aubree Huerta DNP, ANP-c\par

## 2023-05-01 NOTE — RESULTS/DATA
[FreeTextEntry1] : RENATE MCKENZIE  is a 62 year year old female who presents for initial evaluation of mild thrombocytopenia. \par

## 2023-05-02 ENCOUNTER — NON-APPOINTMENT (OUTPATIENT)
Age: 63
End: 2023-05-02

## 2023-05-02 LAB
A PHAGOCYTOPH IGG TITR SER IF: NORMAL TITER
B BURGDOR AB SER QL IA: NEGATIVE
B MICROTI IGG TITR SER: ABNORMAL TITER
E CHAFFEENSIS IGG TITR SER IF: NORMAL TITER

## 2023-09-11 ENCOUNTER — APPOINTMENT (OUTPATIENT)
Dept: RADIOLOGY | Facility: CLINIC | Age: 63
End: 2023-09-11
Payer: MEDICAID

## 2023-09-11 PROCEDURE — 71046 X-RAY EXAM CHEST 2 VIEWS: CPT

## 2023-09-15 ENCOUNTER — RX RENEWAL (OUTPATIENT)
Age: 63
End: 2023-09-15

## 2023-10-16 ENCOUNTER — OUTPATIENT (OUTPATIENT)
Dept: OUTPATIENT SERVICES | Facility: HOSPITAL | Age: 63
LOS: 1 days | End: 2023-10-16

## 2023-10-16 DIAGNOSIS — D64.9 ANEMIA, UNSPECIFIED: ICD-10-CM

## 2023-10-23 ENCOUNTER — APPOINTMENT (OUTPATIENT)
Age: 63
End: 2023-10-23
Payer: MEDICAID

## 2023-10-23 VITALS
HEART RATE: 81 BPM | SYSTOLIC BLOOD PRESSURE: 132 MMHG | BODY MASS INDEX: 22.19 KG/M2 | OXYGEN SATURATION: 98 % | HEIGHT: 60 IN | WEIGHT: 113 LBS | DIASTOLIC BLOOD PRESSURE: 79 MMHG | TEMPERATURE: 97.8 F

## 2023-10-23 DIAGNOSIS — D69.6 THROMBOCYTOPENIA, UNSPECIFIED: ICD-10-CM

## 2023-10-23 PROCEDURE — 99213 OFFICE O/P EST LOW 20 MIN: CPT

## 2023-10-30 ENCOUNTER — APPOINTMENT (OUTPATIENT)
Age: 63
End: 2023-10-30

## 2023-11-10 ENCOUNTER — APPOINTMENT (OUTPATIENT)
Dept: CARDIOLOGY | Facility: CLINIC | Age: 63
End: 2023-11-10
Payer: MEDICAID

## 2023-11-10 VITALS
HEIGHT: 60 IN | HEART RATE: 82 BPM | BODY MASS INDEX: 21.99 KG/M2 | DIASTOLIC BLOOD PRESSURE: 60 MMHG | SYSTOLIC BLOOD PRESSURE: 108 MMHG | WEIGHT: 112 LBS | OXYGEN SATURATION: 98 %

## 2023-11-10 DIAGNOSIS — R00.2 PALPITATIONS: ICD-10-CM

## 2023-11-10 DIAGNOSIS — E78.5 HYPERLIPIDEMIA, UNSPECIFIED: ICD-10-CM

## 2023-11-10 PROCEDURE — 99212 OFFICE O/P EST SF 10 MIN: CPT

## 2023-11-10 RX ORDER — ROSUVASTATIN CALCIUM 10 MG/1
10 TABLET, FILM COATED ORAL
Qty: 90 | Refills: 0 | Status: DISCONTINUED | COMMUNITY
Start: 2022-07-05 | End: 2023-11-10

## 2023-12-01 ENCOUNTER — APPOINTMENT (OUTPATIENT)
Dept: RADIOLOGY | Facility: CLINIC | Age: 63
End: 2023-12-01
Payer: MEDICAID

## 2023-12-01 PROCEDURE — 73522 X-RAY EXAM HIPS BI 3-4 VIEWS: CPT

## 2024-01-18 ENCOUNTER — APPOINTMENT (OUTPATIENT)
Dept: CARDIOLOGY | Facility: CLINIC | Age: 64
End: 2024-01-18
Payer: MEDICAID

## 2024-01-18 VITALS
HEIGHT: 60 IN | SYSTOLIC BLOOD PRESSURE: 132 MMHG | BODY MASS INDEX: 21.4 KG/M2 | DIASTOLIC BLOOD PRESSURE: 60 MMHG | WEIGHT: 109 LBS | OXYGEN SATURATION: 95 % | HEART RATE: 85 BPM

## 2024-01-18 DIAGNOSIS — R06.09 OTHER FORMS OF DYSPNEA: ICD-10-CM

## 2024-01-18 PROCEDURE — 99214 OFFICE O/P EST MOD 30 MIN: CPT | Mod: 25

## 2024-01-18 PROCEDURE — 93000 ELECTROCARDIOGRAM COMPLETE: CPT

## 2024-01-18 NOTE — CARDIOLOGY SUMMARY
[de-identified] : 1/18/2024, NSR, normal ECG 12/14/2021, NSR, normal ECG [de-identified] : 2/25/2021 3 Day Zio, NSR with rare ectopy. [de-identified] : 2/25/2021, Plain Treadmill Stress Test: Exercised for 6 minutes and 25 seconds utilizing Standard Steve Protocol. No chest pain or abnormal dyspnea. No ECG changes to suggest ischemia.\par   [de-identified] : 11/1/2019, Trace MR LVEF >65%. [de-identified] : 12/1/2022, CTA of the Coronary Arteries: normal coronary arteries.

## 2024-01-18 NOTE — PHYSICAL EXAM
[Normal] : moves all extremities, no focal deficits, normal speech [de-identified] : No carotid bruits auscultated bilaterally

## 2024-01-18 NOTE — HISTORY OF PRESENT ILLNESS
[FreeTextEntry1] : Historical Perspective: 63 year old female with history of anxiety/depression, active smoker (1/2ppd since age of 15) presents in cardiac consultation for chest pain. Patient states that about two weeks ago she developed chest pain mild-moderate in severity in the center of her chest. It lasted about 90 minutes. Deep breaths made it worse. Nothing made it better. Didn't radiate anywhere. Resolved on its own. Patient was doing nothing exertional at the time.  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 Has been going on for many months. Mild in severity. Not getting any better or worse. No associated exertional chest pain.   Current Health Status: Patient states that over the past few months she has been getting dyspnea at rest and when talking. Seeing pulmonologist, but can't get into see him until next week.

## 2024-01-18 NOTE — DISCUSSION/SUMMARY
[FreeTextEntry1] : 1. Dyspnea: recommend echocardiogram. Patient still smoking 3 cigarettes/day. Advised cessation. Patient is following up with pulmonologist next week.   2. HLD: continue atorvastatin 20mg daily.  3. Palpitations: occasional. Already wore 3 day Zio, February 2021, which demonstrated rare ectopy. No need for further workup.   Follow up with Dr. Robertson  [EKG obtained to assist in diagnosis and management of assessed problem(s)] : EKG obtained to assist in diagnosis and management of assessed problem(s)

## 2024-01-25 ENCOUNTER — APPOINTMENT (OUTPATIENT)
Dept: CT IMAGING | Facility: CLINIC | Age: 64
End: 2024-01-25
Payer: MEDICAID

## 2024-01-25 PROCEDURE — 71250 CT THORAX DX C-: CPT

## 2024-01-29 ENCOUNTER — APPOINTMENT (OUTPATIENT)
Dept: CARDIOLOGY | Facility: CLINIC | Age: 64
End: 2024-01-29
Payer: MEDICAID

## 2024-01-29 PROCEDURE — 93306 TTE W/DOPPLER COMPLETE: CPT

## 2024-01-30 ENCOUNTER — OFFICE (OUTPATIENT)
Dept: URBAN - METROPOLITAN AREA CLINIC 38 | Facility: CLINIC | Age: 64
Setting detail: OPHTHALMOLOGY
End: 2024-01-30
Payer: COMMERCIAL

## 2024-01-30 DIAGNOSIS — H01.004: ICD-10-CM

## 2024-01-30 DIAGNOSIS — H52.4: ICD-10-CM

## 2024-01-30 DIAGNOSIS — H25.13: ICD-10-CM

## 2024-01-30 DIAGNOSIS — H01.001: ICD-10-CM

## 2024-01-30 PROBLEM — H01.005 BLEPHARITIS; RIGHT UPPER LID, LEFT UPPER LID , RIGHT LOWER LID, LEFT LOWER LID: Status: ACTIVE | Noted: 2024-01-30

## 2024-01-30 PROBLEM — H01.002 BLEPHARITIS; RIGHT UPPER LID, LEFT UPPER LID , RIGHT LOWER LID, LEFT LOWER LID: Status: ACTIVE | Noted: 2024-01-30

## 2024-01-30 PROCEDURE — 92014 COMPRE OPH EXAM EST PT 1/>: CPT | Performed by: OPHTHALMOLOGY

## 2024-01-30 PROCEDURE — 92015 DETERMINE REFRACTIVE STATE: CPT | Performed by: OPHTHALMOLOGY

## 2024-01-30 ASSESSMENT — REFRACTION_MANIFEST
OU_VA: 20/20
OS_CYLINDER: SPHERE
OD_CYLINDER: -0.25
OS_CYLINDER: SPH
OS_VA2: 20/20(J1+)
OD_SPHERE: PLANO
OD_VA1: 20/20
OD_VA2: 20/20(J1+)
OD_AXIS: 123
OD_ADD: +2.25
OS_VA2: 20/20(J1+)
OU_VA: 20/20
OD_VA2: 20/20(J1+)
OD_ADD: +2.75
OS_VA1: 20/20
OD_AXIS: 125
OD_VA1: 20/20
OS_ADD: +2.25
OS_ADD: +2.75
OS_SPHERE: PLANO
OS_SPHERE: PLANO
OD_CYLINDER: -0.25
OD_SPHERE: PLANO
OS_VA1: 20/20

## 2024-01-30 ASSESSMENT — REFRACTION_CURRENTRX
OD_AXIS: 033
OS_SPHERE: +1.50
OS_AXIS: 66
OS_VPRISM_DIRECTION: SV
OS_SPHERE: +2.00
OD_VPRISM_DIRECTION: SV
OD_OVR_VA: 20/
OS_OVR_VA: 20/
OD_VPRISM_DIRECTION: SV
OS_SPHERE: +2.50
OS_VPRISM_DIRECTION: SV
OS_CYLINDER: +1.00
OD_CYLINDER: +0.75
OS_AXIS: 172
OD_CYLINDER: -0.50
OD_VPRISM_DIRECTION: SV
OD_SPHERE: +2.50
OD_OVR_VA: 20/
OD_OVR_VA: 20/
OS_VPRISM_DIRECTION: SV
OS_OVR_VA: 20/
OS_CYLINDER: -0.75
OS_OVR_VA: 20/
OD_CYLINDER: +0.50
OD_SPHERE: +2.00
OS_CYLINDER: +0.75
OD_AXIS: 036
OD_AXIS: 126
OS_AXIS: 169
OD_SPHERE: +1.75

## 2024-01-30 ASSESSMENT — LID EXAM ASSESSMENTS
OD_BLEPHARITIS: RLL RUL 1+
OS_BLEPHARITIS: LLL LUL 1+

## 2024-01-30 ASSESSMENT — CONFRONTATIONAL VISUAL FIELD TEST (CVF)
OS_FINDINGS: FULL
OD_FINDINGS: FULL

## 2024-01-30 ASSESSMENT — SPHEQUIV_DERIVED
OS_SPHEQUIV: 1
OD_SPHEQUIV: 0.625

## 2024-01-30 ASSESSMENT — REFRACTION_AUTOREFRACTION
OS_SPHERE: +1.75
OS_CYLINDER: -1.50
OD_CYLINDER: -0.75
OD_AXIS: 123
OS_AXIS: 070
OD_SPHERE: +1.00

## 2024-02-16 ENCOUNTER — APPOINTMENT (OUTPATIENT)
Dept: THORACIC SURGERY | Facility: CLINIC | Age: 64
End: 2024-02-16
Payer: MEDICAID

## 2024-02-16 VITALS
HEART RATE: 78 BPM | OXYGEN SATURATION: 99 % | WEIGHT: 111.6 LBS | TEMPERATURE: 98 F | BODY MASS INDEX: 21.91 KG/M2 | SYSTOLIC BLOOD PRESSURE: 115 MMHG | HEIGHT: 60 IN | DIASTOLIC BLOOD PRESSURE: 73 MMHG

## 2024-02-16 PROCEDURE — 99202 OFFICE O/P NEW SF 15 MIN: CPT

## 2024-02-16 NOTE — PHYSICAL EXAM
[General Appearance - Alert] : alert [General Appearance - In No Acute Distress] : in no acute distress [Neck Appearance] : the appearance of the neck was normal [Neck Cervical Mass (___cm)] : no neck mass was observed [] : no respiratory distress [Respiration, Rhythm And Depth] : normal respiratory rhythm and effort [Examination Of The Chest] : the chest was normal in appearance [FreeTextEntry1] : Mild xyphoid depression, no tenderness [Bowel Sounds] : normal bowel sounds [Abdomen Soft] : soft [Abdomen Tenderness] : non-tender [Cervical Lymph Nodes Enlarged Posterior Bilaterally] : posterior cervical [Cervical Lymph Nodes Enlarged Anterior Bilaterally] : anterior cervical [Oriented To Time, Place, And Person] : oriented to person, place, and time

## 2024-02-16 NOTE — ASSESSMENT
[FreeTextEntry1] : Ms. East has mild pectus depression, but does not have clinical significance, given her right ventricular function is not compromised based on echocardiogram.   I have advised her to follow up with her Pulmonologist to decipher another reason for her shortness of breath.

## 2024-02-16 NOTE — HISTORY OF PRESENT ILLNESS
[FreeTextEntry1] : Ms. MCKENZIE is a 63 year old female referred by Dr. Bhandari  who presents for consultation. Her past medical history includes HLD, shortness of breath, anxiety/depression, osteoporosis, rheumatoid arthritis, and active smoker (1/2ppd since age of 15).  She presents to the office today to discuss pectus excavatum noted on exam

## 2024-04-11 ENCOUNTER — OFFICE (OUTPATIENT)
Dept: URBAN - METROPOLITAN AREA CLINIC 38 | Facility: CLINIC | Age: 64
Setting detail: OPHTHALMOLOGY
End: 2024-04-11
Payer: COMMERCIAL

## 2024-04-11 ENCOUNTER — RX ONLY (RX ONLY)
Age: 64
End: 2024-04-11

## 2024-04-11 DIAGNOSIS — H16.223: ICD-10-CM

## 2024-04-11 DIAGNOSIS — H01.001: ICD-10-CM

## 2024-04-11 DIAGNOSIS — H01.002: ICD-10-CM

## 2024-04-11 DIAGNOSIS — H01.004: ICD-10-CM

## 2024-04-11 PROCEDURE — 99213 OFFICE O/P EST LOW 20 MIN: CPT | Performed by: OPHTHALMOLOGY

## 2024-04-11 ASSESSMENT — LID EXAM ASSESSMENTS
OD_BLEPHARITIS: RLL RUL 1+
OS_BLEPHARITIS: LLL LUL 1+

## 2024-05-17 ENCOUNTER — APPOINTMENT (OUTPATIENT)
Dept: CARDIOLOGY | Facility: CLINIC | Age: 64
End: 2024-05-17

## 2024-05-17 ENCOUNTER — APPOINTMENT (OUTPATIENT)
Dept: CARDIOLOGY | Facility: CLINIC | Age: 64
End: 2024-05-17
Payer: MEDICAID

## 2024-05-17 VITALS
DIASTOLIC BLOOD PRESSURE: 52 MMHG | OXYGEN SATURATION: 97 % | HEIGHT: 60 IN | HEART RATE: 83 BPM | WEIGHT: 110 LBS | BODY MASS INDEX: 21.6 KG/M2 | SYSTOLIC BLOOD PRESSURE: 112 MMHG

## 2024-05-17 DIAGNOSIS — E78.00 PURE HYPERCHOLESTEROLEMIA, UNSPECIFIED: ICD-10-CM

## 2024-05-17 PROCEDURE — 99214 OFFICE O/P EST MOD 30 MIN: CPT

## 2024-05-17 RX ORDER — ATORVASTATIN CALCIUM 10 MG/1
10 TABLET, FILM COATED ORAL
Qty: 45 | Refills: 3 | Status: ACTIVE | COMMUNITY
Start: 2024-05-17 | End: 1900-01-01

## 2024-05-17 NOTE — REASON FOR VISIT
[FreeTextEntry1] : The patient is seen for follow-up of hyperlipidemia.  The patient has had elevated transaminases both on a atorvastatin as well as rosuvastatin.   The patient has not attempted low-dose atorvastatin every other day.  The patient has fairly good exercise capacity without exertional symptoms.  There has been no recurrence of chest discomfort.

## 2024-05-17 NOTE — ASSESSMENT
[FreeTextEntry1] : Hyperlipidemia: The patient drinks small amounts of alcohol.  I have recommended alcohol cessation.  Will attempt a atorvastatin 10 mg p.o. every other day.  LFTs and cholesterol profile in the fasting state have been arranged for 6 weeks time.  Chest discomfort: No recurrence.  CT coronary angiography in December 2022 revealed normal coronary arteries.  Transthoracic echocardiography in January of 2024 revealed ejection fraction of 60 to 65%.  AST and ALT were normal at 31 and 23 and LDL cholesterol was 148 on 5/9/2024.

## 2024-07-11 ENCOUNTER — APPOINTMENT (OUTPATIENT)
Dept: CARDIOLOGY | Facility: CLINIC | Age: 64
End: 2024-07-11
Payer: MEDICAID

## 2024-07-11 VITALS
HEIGHT: 60 IN | WEIGHT: 110 LBS | OXYGEN SATURATION: 97 % | HEART RATE: 80 BPM | BODY MASS INDEX: 21.6 KG/M2 | SYSTOLIC BLOOD PRESSURE: 110 MMHG | DIASTOLIC BLOOD PRESSURE: 62 MMHG

## 2024-07-11 DIAGNOSIS — R07.9 CHEST PAIN, UNSPECIFIED: ICD-10-CM

## 2024-07-11 PROCEDURE — 99214 OFFICE O/P EST MOD 30 MIN: CPT

## 2024-09-29 ENCOUNTER — NON-APPOINTMENT (OUTPATIENT)
Age: 64
End: 2024-09-29

## 2024-12-25 PROBLEM — F10.90 ALCOHOL USE: Status: ACTIVE | Noted: 2019-10-24

## 2025-01-13 ENCOUNTER — APPOINTMENT (OUTPATIENT)
Dept: CT IMAGING | Facility: CLINIC | Age: 65
End: 2025-01-13
Payer: MEDICARE

## 2025-01-13 PROCEDURE — 71250 CT THORAX DX C-: CPT

## 2025-03-21 ENCOUNTER — APPOINTMENT (OUTPATIENT)
Dept: CARDIOLOGY | Facility: CLINIC | Age: 65
End: 2025-03-21
Payer: MEDICARE

## 2025-03-21 ENCOUNTER — NON-APPOINTMENT (OUTPATIENT)
Age: 65
End: 2025-03-21

## 2025-03-21 VITALS
HEIGHT: 61 IN | HEART RATE: 81 BPM | DIASTOLIC BLOOD PRESSURE: 78 MMHG | WEIGHT: 109 LBS | OXYGEN SATURATION: 98 % | SYSTOLIC BLOOD PRESSURE: 116 MMHG | BODY MASS INDEX: 20.58 KG/M2

## 2025-03-21 DIAGNOSIS — R07.9 CHEST PAIN, UNSPECIFIED: ICD-10-CM

## 2025-03-21 DIAGNOSIS — E78.5 HYPERLIPIDEMIA, UNSPECIFIED: ICD-10-CM

## 2025-03-21 DIAGNOSIS — E78.00 PURE HYPERCHOLESTEROLEMIA, UNSPECIFIED: ICD-10-CM

## 2025-03-21 PROCEDURE — 99204 OFFICE O/P NEW MOD 45 MIN: CPT

## 2025-03-21 PROCEDURE — 93000 ELECTROCARDIOGRAM COMPLETE: CPT

## 2025-03-21 PROCEDURE — G2211 COMPLEX E/M VISIT ADD ON: CPT

## 2025-05-12 ENCOUNTER — RX RENEWAL (OUTPATIENT)
Age: 65
End: 2025-05-12

## 2025-09-08 ENCOUNTER — APPOINTMENT (OUTPATIENT)
Dept: MRI IMAGING | Facility: CLINIC | Age: 65
End: 2025-09-08
Payer: MEDICARE

## 2025-09-08 PROCEDURE — 72141 MRI NECK SPINE W/O DYE: CPT

## 2025-09-08 PROCEDURE — 72148 MRI LUMBAR SPINE W/O DYE: CPT

## 2025-09-19 ENCOUNTER — APPOINTMENT (OUTPATIENT)
Dept: CARDIOLOGY | Facility: CLINIC | Age: 65
End: 2025-09-19
Payer: MEDICARE

## 2025-09-19 VITALS
DIASTOLIC BLOOD PRESSURE: 62 MMHG | WEIGHT: 109.38 LBS | BODY MASS INDEX: 20.65 KG/M2 | HEART RATE: 73 BPM | SYSTOLIC BLOOD PRESSURE: 108 MMHG | HEIGHT: 61 IN | OXYGEN SATURATION: 99 %

## 2025-09-19 DIAGNOSIS — R00.2 PALPITATIONS: ICD-10-CM

## 2025-09-19 DIAGNOSIS — E78.00 PURE HYPERCHOLESTEROLEMIA, UNSPECIFIED: ICD-10-CM

## 2025-09-19 DIAGNOSIS — E78.5 HYPERLIPIDEMIA, UNSPECIFIED: ICD-10-CM

## 2025-09-19 PROCEDURE — 93000 ELECTROCARDIOGRAM COMPLETE: CPT

## 2025-09-19 PROCEDURE — G2211 COMPLEX E/M VISIT ADD ON: CPT

## 2025-09-19 PROCEDURE — 99214 OFFICE O/P EST MOD 30 MIN: CPT
